# Patient Record
Sex: FEMALE | Employment: FULL TIME | URBAN - METROPOLITAN AREA
[De-identification: names, ages, dates, MRNs, and addresses within clinical notes are randomized per-mention and may not be internally consistent; named-entity substitution may affect disease eponyms.]

---

## 2020-01-13 ENCOUNTER — OFFICE VISIT (OUTPATIENT)
Dept: PAIN CLINIC | Facility: HOSPITAL | Age: 55
End: 2020-01-13
Attending: ANESTHESIOLOGY
Payer: COMMERCIAL

## 2020-01-13 VITALS
WEIGHT: 260 LBS | SYSTOLIC BLOOD PRESSURE: 119 MMHG | BODY MASS INDEX: 36.4 KG/M2 | HEIGHT: 71 IN | RESPIRATION RATE: 18 BRPM | DIASTOLIC BLOOD PRESSURE: 78 MMHG | HEART RATE: 71 BPM
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
## 2020-01-13 DIAGNOSIS — M51.36 DDD (DEGENERATIVE DISC DISEASE), LUMBAR: ICD-10-CM

## 2020-01-13 DIAGNOSIS — M54.50 MIDLINE LOW BACK PAIN WITHOUT SCIATICA, UNSPECIFIED CHRONICITY: Primary | ICD-10-CM

## 2020-01-13 PROCEDURE — 99201 HC OUTPT EVAL AND MGNT NEW PT LEVEL 1: CPT

## 2020-01-13 RX ORDER — MELOXICAM 7.5 MG/1
TABLET ORAL
COMMUNITY
Start: 2020-01-03

## 2020-01-13 RX ORDER — ACETAMINOPHEN 500 MG
500 TABLET ORAL EVERY 6 HOURS PRN
COMMUNITY

## 2020-01-13 NOTE — PROGRESS NOTES
01/13/2020  INITIAL CONSULT  PRESENTS AMBULATORY TO [facility] ACCOMPANIED BY HER MOM;  NEW CONSULT C/O 2 1/2 HX OF RLBP;  PT REPORTS SHE WAS MOVING A MATTRESS  AND AFTER THAT SHE STARTED HAVING  THE PAIN;  TODAY 4/10 WITH MOBIC 7.5 & EXTRA STRENGTH  TYLENOL;  PT

## 2020-01-13 NOTE — CHRONIC PAIN
[facility]  Pain Consultation     HISTORY OF PRESENT ILLNESS:  Emir Lane is a 53year old old female referred to the pain clinic by Dr. Destiney saldivar.  provider found for Midline low back pain without sciatica, unspecified chronicity  (prima as above  Coughing/sneezing/straining does  exacerbate the pain.   Numbness/tingling: as above  Weakness: as above  Weight Loss: Negative   Fever: Negative   Cardiovascular:  No current chest pain or palpitations   Respiratory:  No current shortness of olinda violence:        Fear of current or ex partner: Not on file        Emotionally abused: Not on file        Physically abused: Not on file        Forced sexual activity: Not on file    Other Topics      Concerns:        Not on file    Social History Sundar Starkey L2/3 degenerative changes of the disc throughout the lumbar spine no fractures dated 1/3/2020    LABS:  No results found for: WBC, RBC, HGB, HCT, MCV, MCH, MCHC, RDW, PLT, MPV  No results found for: NA, K, CL, CO2, BUN, GLU, CA  No results found for: PT

## 2020-01-17 ENCOUNTER — HOSPITAL ENCOUNTER (OUTPATIENT)
Dept: MRI IMAGING | Age: 55
Discharge: HOME | End: 2020-01-17
Attending: ANESTHESIOLOGY
Payer: COMMERCIAL
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
## 2020-01-17 DIAGNOSIS — M54.50 MIDLINE LOW BACK PAIN WITHOUT SCIATICA, UNSPECIFIED CHRONICITY: ICD-10-CM

## 2020-01-17 DIAGNOSIS — M51.36 DDD (DEGENERATIVE DISC DISEASE), LUMBAR: ICD-10-CM

## 2020-01-17 PROCEDURE — 72148 MRI LUMBAR SPINE W/O DYE: CPT | Performed by: ANESTHESIOLOGY

## 2020-01-22 ENCOUNTER — TELEPHONE (OUTPATIENT)
Dept: PAIN CLINIC | Facility: HOSPITAL | Age: 55
End: 2020-01-22

## 2020-01-23 ENCOUNTER — TELEPHONE (OUTPATIENT)
Dept: PAIN CLINIC | Facility: HOSPITAL | Age: 55
End: 2020-01-23

## 2020-01-23 NOTE — TELEPHONE ENCOUNTER
Called patient with MRI results. All questions answered. Will plan for JACKY. Notified patient that our pain navigator would call her back to schedule.  She verbalized understanding

## 2020-01-28 ENCOUNTER — DOCUMENTATION ONLY (OUTPATIENT)
Dept: PAIN CLINIC | Facility: HOSPITAL | Age: 55
End: 2020-01-28

## 2020-01-28 NOTE — PROGRESS NOTES
Procedure code [id number]    Scheduled for 01/29/2020    PA  Needed via [id number] [email or web address] web portal     Case stated, clinical submitted, case approved    Sara Hamilton #[id number]  Valid from 01/28/2020---03/28/2020    Order form faxed to the surgery center, confirmatio

## 2020-02-14 ENCOUNTER — OFFICE VISIT (OUTPATIENT)
Dept: PAIN CLINIC | Facility: HOSPITAL | Age: 55
End: 2020-02-14
Attending: ANESTHESIOLOGY
Payer: COMMERCIAL

## 2020-02-14 VITALS
SYSTOLIC BLOOD PRESSURE: 125 MMHG | WEIGHT: 260 LBS | DIASTOLIC BLOOD PRESSURE: 80 MMHG | HEIGHT: 71 IN | BODY MASS INDEX: 36.4 KG/M2 | RESPIRATION RATE: 18 BRPM | HEART RATE: 79 BPM

## 2020-02-14 DIAGNOSIS — M54.50 MIDLINE LOW BACK PAIN WITHOUT SCIATICA, UNSPECIFIED CHRONICITY: ICD-10-CM

## 2020-02-14 DIAGNOSIS — M51.36 DDD (DEGENERATIVE DISC DISEASE), LUMBAR: Primary | ICD-10-CM

## 2020-02-14 PROCEDURE — 99211 OFF/OP EST MAY X REQ PHY/QHP: CPT

## 2020-02-14 NOTE — PROGRESS NOTES
02/14/2020  PT. PRESENTS AMBULATORY TO [facility], FOLLOW UP POST LESI L4/5 1/29/20 WITH GRADY. PT. REPORTS 50% RELIEF AND STATES PAIN LEVEL 4/10. WILL BE SEEN BY DR. Xin Avila REFER TO DICTATION FOR POC.

## 2020-02-14 NOTE — CHRONIC PAIN
[facility]  Pain Consultation     HISTORY OF PRESENT ILLNESS:  Sheldon Barron is a 53year old old female referred to the pain clinic by Dr. Cabello ref.  provider found for DDD (degenerative disc disease), lumbar  (primary encounter diagnosi MG Oral Tab TK 1 T PO BID     • acetaminophen 500 MG Oral Tab Take 500 mg by mouth every 6 (six) hours as needed for Pain. Scheduled Meds:  Continuous Infusions:  PRN Meds:.         ALLERGIES:    Latex                   RASH    SURGICAL HISTORY:  Hist on file      Stress: Not on file    Relationships      Social connections:        Talks on phone: Not on file        Gets together: Not on file        Attends Holiness service: Not on file        Active member of club or organization: Not on file        A tendon reflexes: Symmetric   Right Ankle Deep tendon reflexes: Symmetric     Left Knee Deep tendon reflexes: Symmetric   Left Ankle Deep tendon reflexes: Symmetric     IMAGING:  X-rays performed at Dr. Claire Solares office taken from his consult lumbar spine and treatment options), face to face time, time spent reviewing data, obtaining patient information and discussing the care with the patients health care providers.      Pt will return to clinic to call the service after completion of the MRI for review and

## 2020-02-19 ENCOUNTER — TELEPHONE (OUTPATIENT)
Dept: PAIN CLINIC | Facility: HOSPITAL | Age: 55
End: 2020-02-19

## 2020-02-19 NOTE — TELEPHONE ENCOUNTER
This writer rcv'd message from [employer] to schedule procedure. Writer attempted to reach pt at # [phone]-- no answer didn't leave a voice message as the voicemail stated this is \"Shanice\". [employer] message also sent to pt requesting a call back.

## 2020-02-20 ENCOUNTER — DOCUMENTATION ONLY (OUTPATIENT)
Dept: PAIN CLINIC | Facility: HOSPITAL | Age: 55
End: 2020-02-20

## 2020-02-20 NOTE — PROGRESS NOTES
Procedure code [id number]    PA needed via [employer] ([facility])   All clinical notes submitted via [email or web address] web portal.     Case has been approved    auth # [id number]  Valid from 02/20/2020----04/20/2020    Writer will reach out to pt and schedule procedure

## 2020-02-24 ENCOUNTER — TELEPHONE (OUTPATIENT)
Dept: PAIN CLINIC | Facility: HOSPITAL | Age: 55
End: 2020-02-24

## 2020-02-24 DIAGNOSIS — G89.29 CHRONIC MIDLINE LOW BACK PAIN, UNSPECIFIED WHETHER SCIATICA PRESENT: Primary | ICD-10-CM

## 2020-02-24 DIAGNOSIS — M54.50 CHRONIC MIDLINE LOW BACK PAIN, UNSPECIFIED WHETHER SCIATICA PRESENT: Primary | ICD-10-CM

## 2020-02-24 RX ORDER — GABAPENTIN 300 MG/1
300 CAPSULE ORAL 3 TIMES DAILY
Qty: 90 CAPSULE | Refills: 0 | Status: SHIPPED | OUTPATIENT
Start: 2020-02-24 | End: 2020-03-25